# Patient Record
(demographics unavailable — no encounter records)

---

## 2025-01-06 NOTE — DISCUSSION/SUMMARY
[FreeTextEntry1] : 24 yo here for pill check reviewed alternatives for progesterone methods rx std panel condoms reviewed side effects of pops. f/u 6 months

## 2025-01-06 NOTE — HISTORY OF PRESENT ILLNESS
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  Previously was on combined oral contraceptive pills and had normal periods, but this was before diagnosis of thrombophilia.  Counseled patient on lower efficacy of progesterone only pills, since she is not currently sexually active she could stop the pill due to the breakthrough bleeding and use a condom which was advised anyways.  I reviewed Depo risks benefits Nexplanon risks benefits and IUD risks benefits.  Patient does not want any of these.  She does want to continue progesterone only pills even though she is having irregular bleeding. [PapSmeardate] : June 2024 [LMPDate] : 01/01/25

## 2025-01-06 NOTE — PHYSICAL EXAM
[TextEntry] : General appearance well-appearing no acute distress  Breast examined in the upright and supine position.  Breast exam within normal limits, no masses no lymphadenopathy nontender bilaterally Abdomen soft nontender nondistended no rebound no guarding